# Patient Record
Sex: MALE | Race: WHITE | NOT HISPANIC OR LATINO | Employment: FULL TIME | URBAN - METROPOLITAN AREA
[De-identification: names, ages, dates, MRNs, and addresses within clinical notes are randomized per-mention and may not be internally consistent; named-entity substitution may affect disease eponyms.]

---

## 2018-07-16 ENCOUNTER — HOSPITAL ENCOUNTER (OUTPATIENT)
Dept: CT IMAGING | Facility: HOSPITAL | Age: 38
Discharge: HOME | End: 2018-07-16
Admitting: NURSE PRACTITIONER

## 2018-07-16 ENCOUNTER — TRANSCRIBE ORDERS (OUTPATIENT)
Dept: ADMINISTRATIVE | Facility: HOSPITAL | Age: 38
End: 2018-07-16

## 2018-07-16 DIAGNOSIS — R10.9 LEFT FLANK PAIN: ICD-10-CM

## 2018-07-16 DIAGNOSIS — R10.9 LEFT FLANK PAIN: Primary | ICD-10-CM

## 2018-07-16 PROCEDURE — 74176 CT ABD & PELVIS W/O CONTRAST: CPT

## 2020-10-01 ENCOUNTER — OFFICE VISIT (OUTPATIENT)
Dept: PULMONOLOGY | Facility: CLINIC | Age: 40
End: 2020-10-01

## 2020-10-01 VITALS
OXYGEN SATURATION: 93 % | TEMPERATURE: 97.3 F | BODY MASS INDEX: 38.02 KG/M2 | SYSTOLIC BLOOD PRESSURE: 142 MMHG | HEART RATE: 114 BPM | HEIGHT: 70 IN | WEIGHT: 265.6 LBS | DIASTOLIC BLOOD PRESSURE: 78 MMHG

## 2020-10-01 DIAGNOSIS — U07.1 COVID-19: ICD-10-CM

## 2020-10-01 DIAGNOSIS — J12.82 PNEUMONIA DUE TO COVID-19 VIRUS: ICD-10-CM

## 2020-10-01 DIAGNOSIS — U07.1 ACUTE RESPIRATORY DISTRESS SYNDROME (ARDS) DUE TO COVID-19 VIRUS (HCC): ICD-10-CM

## 2020-10-01 DIAGNOSIS — R09.02 HYPOXIA: Primary | ICD-10-CM

## 2020-10-01 DIAGNOSIS — J80 ACUTE RESPIRATORY DISTRESS SYNDROME (ARDS) DUE TO COVID-19 VIRUS (HCC): ICD-10-CM

## 2020-10-01 DIAGNOSIS — U07.1 PNEUMONIA DUE TO COVID-19 VIRUS: ICD-10-CM

## 2020-10-01 DIAGNOSIS — E66.9 OBESITY, CLASS II, BMI 35-39.9: ICD-10-CM

## 2020-10-01 PROCEDURE — 99204 OFFICE O/P NEW MOD 45 MIN: CPT | Performed by: INTERNAL MEDICINE

## 2020-10-01 RX ORDER — ALBUTEROL SULFATE 90 UG/1
AEROSOL, METERED RESPIRATORY (INHALATION)
COMMUNITY
Start: 2020-09-03

## 2020-10-01 NOTE — PROGRESS NOTES
"  PULMONARY  NOTE    Chief Complaint     Hypoxic respiratory failure, COVID pneumonia, non-smoker    History of Present Illness     40-year-old white male referred for evaluation of persistent respiratory symptoms  He has been referred by Dr. Chinchilla    He is a lifelong non-smoker and has no history of known lung disease or cardiac disease    In August 2020 he was admitted to [facility] with hypoxic respiratory failure, bilateral pulmonary infiltrates and a positive COVID test  He was felt to have COVID pneumonia and possibly COVID associated ARDS  He spent 10 days in the ICU, 4 days in the hospital and was discharged on supplemental oxygen    He still has a supplemental oxygen at home but indicates that he feels that he is doing better and has not been using it for the last 3 or 4 weeks.  He does indicate that he \"desaturates easily\" with saturations dropping down into the 70s with exertion.    He has dyspnea on exertion, not at rest.  He feels that he is \"getting better every day\".    He has a persistent cough but no sputum production.  He did have some hemoptysis when he was in the hospital, but none since discharge    He has not had a chest x-ray since before discharge on 8/28/2020    During his hospital stay he received steroids and Remdesivir.  He apparently did not receive convalescent serum.    He is a lifelong non-smoker has no prior history of known lung disease    Patient Active Problem List   Diagnosis   • COVID-19   • Acute respiratory distress syndrome (ARDS) due to COVID-19 virus (CMS/HCC)   • Acute respiratory failure with hypoxia (CMS/HCC)   • Pneumonia due to COVID-19 virus   • Obesity, Class II, BMI 35-39.9   • Non-smoker     No Known Allergies    Current Outpatient Medications:   •  albuterol sulfate  (90 Base) MCG/ACT inhaler, INHALE 2 PUFFS PO Q 4 H PRF SHORTNESS OF BREATH, Disp: , Rfl:   MEDICATION LIST AND ALLERGIES REVIEWED.    Family History   Problem Relation Age of Onset   • " "Hypertension Mother    • Hypertension Father    • No Known Problems Brother    • No Known Problems Maternal Grandmother    • No Known Problems Maternal Grandfather    • No Known Problems Paternal Grandmother    • Dementia Paternal Grandfather    • Alzheimer's disease Paternal Grandfather    • No Known Problems Brother      Social History     Tobacco Use   • Smoking status: Never Smoker   • Smokeless tobacco: Never Used   Substance Use Topics   • Alcohol use: Never     Frequency: Never   • Drug use: Never     Social History     Social History Narrative        Works as an  for a community [employer]    Lifelong non-smoker    Does not drink alcohol     FAMILY AND SOCIAL HISTORY REVIEWED.    Review of Systems  ALSO REFER TO SCANNED ROS SHEET FROM SAME DATE.    /78 (BP Location: Left arm, Patient Position: Sitting, Cuff Size: Adult)   Pulse 114   Temp 97.3 °F (36.3 °C) (Temporal)   Ht 177.8 cm (70\")   Wt 120 kg (265 lb 9.6 oz)   SpO2 93% Comment: room air seated  BMI 38.11 kg/m²   Physical Exam  Vitals signs and nursing note reviewed.   Constitutional:       General: He is not in acute distress.     Appearance: He is well-developed. He is not diaphoretic.   HENT:      Head: Normocephalic and atraumatic.   Neck:      Thyroid: No thyromegaly.   Cardiovascular:      Rate and Rhythm: Normal rate and regular rhythm.      Heart sounds: Normal heart sounds. No murmur.   Pulmonary:      Effort: Pulmonary effort is normal.      Breath sounds: No stridor.      Comments: Decreased breath sounds bilaterally without wheezes or crackles  Abdominal:      General: Bowel sounds are normal.      Palpations: Abdomen is soft.   Musculoskeletal: Normal range of motion.      Right lower leg: No edema.      Left lower leg: No edema.   Lymphadenopathy:      Cervical: No cervical adenopathy.      Upper Body:      Right upper body: No supraclavicular or epitrochlear adenopathy.      Left upper body: No supraclavicular or " epitrochlear adenopathy.   Skin:     General: Skin is warm and dry.   Neurological:      Mental Status: He is alert and oriented to person, place, and time.   Psychiatric:         Behavior: Behavior normal.         Results     Chest x-ray reveals cardiomegaly and a prominent interstitial pattern, not seen on a prior CT scan of the abdomen/pelvis in 2018    Problem List       ICD-10-CM ICD-9-CM   1. Hypoxia  R09.02 799.02   2. COVID-19  U07.1 079.89   3. Acute respiratory distress syndrome (ARDS) due to COVID-19 virus (CMS/[facility])  U07.1 518.82    J80 079.89   4. Pneumonia due to COVID-19 virus  U07.1 480.8    J12.89    5. Obesity, Class II, BMI 35-39.9  E66.9 278.00       Discussion     I did not see him during his hospital stay and I have yet to get records from [facility].  However, he reports that he is improving on a regular basis.  It appears that his gas exchange is improving, as well.    We discussed COVID pneumonia and ARDS related to COVID.  It is not unexpected that he would have a prolonged recovery.  He very well may develop some degree of irreversible, fibrotic lung injury related to his pneumonia.    He continues to require oxygen supplementation although he is improved  I encourage continued use of supplemental oxygen at night and during the day with exertion    I am concerned about his cardiomegaly on chest x-ray and we will get a transthoracic echocardiogram  And cardiac MRI might need to be considered    I will plan to see him back in a month or so  The meantime, will obtain records from [facility] including chest imaging for review    Robert Beck MD  Note electronically signed    CC: Aurelia Jimenez, APRN

## 2020-10-02 DIAGNOSIS — J96.01 ACUTE RESPIRATORY FAILURE WITH HYPOXIA (HCC): Primary | ICD-10-CM

## 2020-11-10 ENCOUNTER — HOSPITAL ENCOUNTER (OUTPATIENT)
Dept: CARDIOLOGY | Facility: HOSPITAL | Age: 40
Discharge: HOME | End: 2020-11-10
Admitting: INTERNAL MEDICINE

## 2020-11-10 VITALS — WEIGHT: 265 LBS | HEIGHT: 70 IN | BODY MASS INDEX: 37.94 KG/M2

## 2020-11-10 DIAGNOSIS — J96.01 ACUTE RESPIRATORY FAILURE WITH HYPOXIA (HCC): ICD-10-CM

## 2020-11-10 LAB
ASCENDING AORTA: 3 CM
BH CV ECHO MEAS - AO ROOT AREA (BSA CORRECTED): 1.5
BH CV ECHO MEAS - AO ROOT AREA: 9.6 CM^2
BH CV ECHO MEAS - AO ROOT DIAM: 3.5 CM
BH CV ECHO MEAS - ASC AORTA: 3 CM
BH CV ECHO MEAS - BSA(HAYCOCK): 2.5 M^2
BH CV ECHO MEAS - BSA: 2.4 M^2
BH CV ECHO MEAS - BZI_BMI: 38 KILOGRAMS/M^2
BH CV ECHO MEAS - BZI_METRIC_HEIGHT: 177.8 CM
BH CV ECHO MEAS - BZI_METRIC_WEIGHT: 120.2 KG
BH CV ECHO MEAS - EDV(CUBED): 68.9 ML
BH CV ECHO MEAS - EDV(MOD-SP2): 61 ML
BH CV ECHO MEAS - EDV(MOD-SP4): 89 ML
BH CV ECHO MEAS - EDV(TEICH): 74.2 ML
BH CV ECHO MEAS - EF(CUBED): 67.1 %
BH CV ECHO MEAS - EF(MOD-BP): 57 %
BH CV ECHO MEAS - EF(MOD-SP2): 59 %
BH CV ECHO MEAS - EF(MOD-SP4): 57.3 %
BH CV ECHO MEAS - EF(TEICH): 59.1 %
BH CV ECHO MEAS - ESV(CUBED): 22.7 ML
BH CV ECHO MEAS - ESV(MOD-SP2): 25 ML
BH CV ECHO MEAS - ESV(MOD-SP4): 38 ML
BH CV ECHO MEAS - ESV(TEICH): 30.3 ML
BH CV ECHO MEAS - FS: 31 %
BH CV ECHO MEAS - IVS/LVPW: 0.94
BH CV ECHO MEAS - IVSD: 1.2 CM
BH CV ECHO MEAS - LA DIMENSION: 3.9 CM
BH CV ECHO MEAS - LA/AO: 1.1
BH CV ECHO MEAS - LAD MAJOR: 5.1 CM
BH CV ECHO MEAS - LAT PEAK E' VEL: 8.9 CM/SEC
BH CV ECHO MEAS - LATERAL E/E' RATIO: 10.1
BH CV ECHO MEAS - LV DIASTOLIC VOL/BSA (35-75): 37.8 ML/M^2
BH CV ECHO MEAS - LV MASS(C)D: 168.6 GRAMS
BH CV ECHO MEAS - LV MASS(C)DI: 71.7 GRAMS/M^2
BH CV ECHO MEAS - LV SYSTOLIC VOL/BSA (12-30): 16.2 ML/M^2
BH CV ECHO MEAS - LVIDD: 4.1 CM
BH CV ECHO MEAS - LVIDS: 2.8 CM
BH CV ECHO MEAS - LVLD AP2: 7.5 CM
BH CV ECHO MEAS - LVLD AP4: 7.8 CM
BH CV ECHO MEAS - LVLS AP2: 6.6 CM
BH CV ECHO MEAS - LVLS AP4: 7 CM
BH CV ECHO MEAS - LVOT AREA (M): 4.2 CM^2
BH CV ECHO MEAS - LVOT AREA: 4.2 CM^2
BH CV ECHO MEAS - LVOT DIAM: 2.3 CM
BH CV ECHO MEAS - LVPWD: 1.2 CM
BH CV ECHO MEAS - MED PEAK E' VEL: 8.4 CM/SEC
BH CV ECHO MEAS - MEDIAL E/E' RATIO: 10.6
BH CV ECHO MEAS - MV A MAX VEL: 71.8 CM/SEC
BH CV ECHO MEAS - MV DEC SLOPE: 433 CM/SEC^2
BH CV ECHO MEAS - MV DEC TIME: 0.18 SEC
BH CV ECHO MEAS - MV E MAX VEL: 89.4 CM/SEC
BH CV ECHO MEAS - MV E/A: 1.2
BH CV ECHO MEAS - MV P1/2T MAX VEL: 109 CM/SEC
BH CV ECHO MEAS - MV P1/2T: 73.7 MSEC
BH CV ECHO MEAS - MVA P1/2T LCG: 2 CM^2
BH CV ECHO MEAS - MVA(P1/2T): 3 CM^2
BH CV ECHO MEAS - PA ACC SLOPE: 657.5 CM/SEC^2
BH CV ECHO MEAS - PA ACC TIME: 0.11 SEC
BH CV ECHO MEAS - PA PR(ACCEL): 27.5 MMHG
BH CV ECHO MEAS - RVDD: 2.9 CM
BH CV ECHO MEAS - RVSP: 35 MMHG
BH CV ECHO MEAS - SI(CUBED): 19.7 ML/M^2
BH CV ECHO MEAS - SI(MOD-SP2): 15.3 ML/M^2
BH CV ECHO MEAS - SI(MOD-SP4): 21.7 ML/M^2
BH CV ECHO MEAS - SI(TEICH): 18.7 ML/M^2
BH CV ECHO MEAS - SV(CUBED): 46.3 ML
BH CV ECHO MEAS - SV(MOD-SP2): 36 ML
BH CV ECHO MEAS - SV(MOD-SP4): 51 ML
BH CV ECHO MEAS - SV(TEICH): 43.9 ML
BH CV ECHO MEAS - TAPSE (>1.6): 2.9 CM
BH CV ECHO MEAS - TR MAX PG: 25 MMHG
BH CV ECHO MEAS - TR MAX VEL: 249 CM/SEC
BH CV ECHO MEASUREMENTS AVERAGE E/E' RATIO: 10.34
BH CV VAS BP RIGHT ARM: NORMAL MMHG
BH CV XLRA - RV BASE: 3.7 CM
BH CV XLRA - RV LENGTH: 6.2 CM
BH CV XLRA - RV MID: 3.6 CM
BH CV XLRA - TDI S': 12.4 CM/SEC
LEFT ATRIUM VOLUME INDEX: 19.6 ML/M^2
LEFT ATRIUM VOLUME: 46 ML
LV EF 2D ECHO EST: 60 %
MAXIMAL PREDICTED HEART RATE: 181 BPM
STRESS TARGET HR: 154 BPM

## 2020-11-10 PROCEDURE — 93306 TTE W/DOPPLER COMPLETE: CPT | Performed by: INTERNAL MEDICINE

## 2020-11-10 PROCEDURE — 93306 TTE W/DOPPLER COMPLETE: CPT

## 2020-11-12 ENCOUNTER — OFFICE VISIT (OUTPATIENT)
Dept: PULMONOLOGY | Facility: CLINIC | Age: 40
End: 2020-11-12

## 2020-11-12 VITALS
TEMPERATURE: 97.5 F | HEART RATE: 76 BPM | HEIGHT: 70 IN | DIASTOLIC BLOOD PRESSURE: 96 MMHG | WEIGHT: 272.4 LBS | OXYGEN SATURATION: 97 % | BODY MASS INDEX: 39 KG/M2 | SYSTOLIC BLOOD PRESSURE: 148 MMHG

## 2020-11-12 DIAGNOSIS — Z78.9 NON-SMOKER: ICD-10-CM

## 2020-11-12 DIAGNOSIS — U07.1 COVID-19: Primary | ICD-10-CM

## 2020-11-12 DIAGNOSIS — J80 ACUTE RESPIRATORY DISTRESS SYNDROME (ARDS) DUE TO COVID-19 VIRUS (HCC): ICD-10-CM

## 2020-11-12 DIAGNOSIS — U07.1 ACUTE RESPIRATORY DISTRESS SYNDROME (ARDS) DUE TO COVID-19 VIRUS (HCC): ICD-10-CM

## 2020-11-12 PROCEDURE — 99214 OFFICE O/P EST MOD 30 MIN: CPT | Performed by: NURSE PRACTITIONER

## 2020-11-12 NOTE — PROGRESS NOTES
[facility] Pulmonary Follow up    CHIEF COMPLAINT    History of COVID-19 pneumonia, respiratory failure    Subjective   HISTORY OF PRESENT ILLNESS    Duane Allen is a 40 y.o.male here today for follow-up on his echocardiogram.  He is initially seen by Dr. Beck last month for a post Covid pneumonia with acute respiratory distress syndrome.    He was initially diagnosed in mid August and spent several days in the hospital on high flow oxygen and then discharged home on oxygen.    In the last month he has made significant improvement.  He says his shortness of breath is all but gone.  He has been hiking with his wife, raking leaves, and doing yard work with no difficulty at all.  He no longer uses any oxygen.  He denies a cough or sputum production.    When Dr. Beck saw him he did have some cardiomegaly noted on his chest x-ray therefore he sent him for an echocardiogram.  He is here to follow-up today on his echo.        Patient Active Problem List   Diagnosis   • COVID-19   • Acute respiratory distress syndrome (ARDS) due to COVID-19 virus (CMS/HCC)   • Acute respiratory failure with hypoxia (CMS/HCC)   • Pneumonia due to COVID-19 virus   • Obesity, Class II, BMI 35-39.9   • Non-smoker       No Known Allergies    Current Outpatient Medications:   •  albuterol sulfate  (90 Base) MCG/ACT inhaler, INHALE 2 PUFFS PO Q 4 H PRF SHORTNESS OF BREATH, Disp: , Rfl:   MEDICATION LIST AND ALLERGIES REVIEWED.    Social History     Tobacco Use   • Smoking status: Never Smoker   • Smokeless tobacco: Never Used   Substance Use Topics   • Alcohol use: Never     Frequency: Never   • Drug use: Never       FAMILY AND SOCIAL HISTORY REVIEWED.    Review of Systems   Constitutional: Negative for chills, fatigue, fever and unexpected weight change.   HENT: Negative for congestion, nosebleeds, postnasal drip, rhinorrhea, sinus pressure and trouble swallowing.    Respiratory: Negative for cough, chest tightness, shortness of  "breath and wheezing.    Cardiovascular: Negative for chest pain and leg swelling.   Gastrointestinal: Negative for abdominal pain, constipation, diarrhea, nausea and vomiting.   Genitourinary: Negative for dysuria, frequency, hematuria and urgency.   Musculoskeletal: Negative for myalgias.   Neurological: Negative for dizziness, weakness, numbness and headaches.   All other systems reviewed and are negative.  .  Objective   /96   Pulse 76   Temp 97.5 °F (36.4 °C)   Ht 177 cm (69.69\")   Wt 124 kg (272 lb 6.4 oz)   SpO2 97% Comment: resting, room air  BMI 39.43 kg/m²     Immunization History   Administered Date(s) Administered   • Hep B, Adolescent or Pediatric 04/18/2000   • Hep B, Unspecified 10/13/1999, 11/15/1999   • Td 07/29/1996       Physical Exam  Vitals signs and nursing note reviewed.   Constitutional:       Appearance: He is well-developed.   HENT:      Head: Normocephalic and atraumatic.   Eyes:      Pupils: Pupils are equal, round, and reactive to light.   Neck:      Musculoskeletal: Normal range of motion and neck supple.   Cardiovascular:      Rate and Rhythm: Normal rate and regular rhythm.      Heart sounds: No murmur.   Pulmonary:      Effort: Pulmonary effort is normal. No respiratory distress.      Breath sounds: Normal breath sounds. No wheezing or rales.   Abdominal:      General: Bowel sounds are normal. There is no distension.      Palpations: Abdomen is soft.   Musculoskeletal: Normal range of motion.   Skin:     General: Skin is warm and dry.      Findings: No erythema.   Neurological:      Mental Status: He is alert and oriented to person, place, and time.   Psychiatric:         Behavior: Behavior normal.           RESULTS    ECHO   · Estimated left ventricular EF = 60% Left ventricular ejection fraction appears to be 56 - 60%. Left ventricular systolic function is normal.  · Left ventricular wall thickness is consistent with concentric hypertrophy.  · Left ventricular diastolic " function was normal.  · Estimated right ventricular systolic pressure from tricuspid regurgitation is mildly elevated (35-45 mmHg). Calculated right ventricular systolic pressure from tricuspid regurgitation is 35 mmHg.        Assessment/Plan     PROBLEM LIST         ICD-10-CM ICD-9-CM   1. COVID-19  U07.1 079.89   2. Acute respiratory distress syndrome (ARDS) due to COVID-19 virus (CMS/HCC)  U07.1 518.82    J80 079.89   3. Non-smoker  Z78.9 V49.89       Problem List Items Addressed This Visit        Other    COVID-19 - Primary    Overview     August 10, 2020         Acute respiratory distress syndrome (ARDS) due to COVID-19 virus (CMS/HCC)    Non-smoker            DISCUSSION    We did go over his echocardiogram with no evidence of abnormality with a normal ejection fraction and no diastolic dysfunction.  He did have a slightly elevated RV systolic pressure likely related to his acute respiratory failure.      At this time he appears he has fully recovered from his COVID-19.    I would like for him to follow-up in 6 months with full PFTs and a chest x-ray to assure complete resolution of pathology from COVID-19 viral infection.    He also knows to follow-up sooner if he has any changes in condition.        I spent 20-29 minutes face to face with the patient. I spent > 50% percent of this time counseling and discussing diagnostic testing, evaluation, current status, treatment options and management.    KSAIE Clarke  11/12/202009:45 EST  Electronically signed     Please note that portions of this note were completed with a voice recognition program. Efforts were made to edit the dictations, but occasionally words are mistranscribed.      CC: Aurelia Jimenez, KASIE